# Patient Record
(demographics unavailable — no encounter records)

---

## 2024-10-21 NOTE — PHYSICAL EXAM
[Fully active, able to carry on all pre-disease performance without restriction] : Status 0 - Fully active, able to carry on all pre-disease performance without restriction [General Appearance - Alert] : alert [General Appearance - In No Acute Distress] : in no acute distress [Sclera] : the sclera and conjunctiva were normal [PERRL With Normal Accommodation] : pupils were equal in size, round, and reactive to light [Extraocular Movements] : extraocular movements were intact [Outer Ear] : the ears and nose were normal in appearance [Oropharynx] : the oropharynx was normal [Neck Appearance] : the appearance of the neck was normal [Neck Cervical Mass (___cm)] : no neck mass was observed [Jugular Venous Distention Increased] : there was no jugular-venous distention [Thyroid Diffuse Enlargement] : the thyroid was not enlarged [Thyroid Nodule] : there were no palpable thyroid nodules [Auscultation Breath Sounds / Voice Sounds] : lungs were clear to auscultation bilaterally [Heart Rate And Rhythm] : heart rate was normal and rhythm regular [Heart Sounds] : normal S1 and S2 [Heart Sounds Gallop] : no gallops [Murmurs] : no murmurs [Heart Sounds Pericardial Friction Rub] : no pericardial rub [Examination Of The Chest] : the chest was normal in appearance [Chest Visual Inspection Thoracic Asymmetry] : no chest asymmetry [Diminished Respiratory Excursion] : normal chest expansion [2+] : left 2+ [No Abnormalities] : the abdominal aorta was not enlarged and no bruit was heard [Breast Appearance] : normal in appearance [Breast Palpation Mass] : no palpable masses [Bowel Sounds] : normal bowel sounds [Abdomen Soft] : soft [Abdomen Tenderness] : non-tender [Abdomen Mass (___ Cm)] : no abdominal mass palpated [Cervical Lymph Nodes Enlarged Posterior Bilaterally] : posterior cervical [Cervical Lymph Nodes Enlarged Anterior Bilaterally] : anterior cervical [Supraclavicular Lymph Nodes Enlarged Bilaterally] : supraclavicular [Axillary Lymph Nodes Enlarged Bilaterally] : axillary [Femoral Lymph Nodes Enlarged Bilaterally] : femoral [Inguinal Lymph Nodes Enlarged Bilaterally] : inguinal [No CVA Tenderness] : no ~M costovertebral angle tenderness [No Spinal Tenderness] : no spinal tenderness [Abnormal Walk] : normal gait [Nail Clubbing] : no clubbing  or cyanosis of the fingernails [Musculoskeletal - Swelling] : no joint swelling seen [Motor Tone] : muscle strength and tone were normal [Skin Color & Pigmentation] : normal skin color and pigmentation [Skin Turgor] : normal skin turgor [] : no rash [Deep Tendon Reflexes (DTR)] : deep tendon reflexes were 2+ and symmetric [Sensation] : the sensory exam was normal to light touch and pinprick [No Focal Deficits] : no focal deficits [Oriented To Time, Place, And Person] : oriented to person, place, and time [Impaired Insight] : insight and judgment were intact [Affect] : the affect was normal

## 2024-10-28 NOTE — DISCUSSION/SUMMARY
[EKG obtained to assist in diagnosis and management of assessed problem(s)] : EKG obtained to assist in diagnosis and management of assessed problem(s) [FreeTextEntry1] : 73F HTN, HLD with ongoing CP/SORIANO  HTN: BP better on repeat but still elevated.    HLD: LDL mildly elevated (116). With recent CAC 0, would encourage diet, exercise, and weight loss.   CP/SORIANO: Ongoing. TTE with mild DD. Recent CTA without obstructive CAD.    This patient is able to perform >4 METS of activity without limitation. No evidence of ongoing ischemia, arrhythmia, valvular heart disease or decompensated heart failure.  This patient is optimized from a cardiac standpoint for this low to intermediate risk surgery.  No further cardiac testing is necessary prior to surgery.

## 2024-10-28 NOTE — HISTORY OF PRESENT ILLNESS
[FreeTextEntry1] : 73F HTN, HLD, mild DD presents for preop evaluation   Scheduled for bronchoscopy procedure with Dr Jose Randolph on 10/30 Occasional pressure situated in the center of her chest Still with SORIANO when she walks up the stairs. Suffers from vertigo. Rare palpitations when she is anxious.  BP readings have been elevated, ~160s/80-90s Used to be on anti-hypertensives many yrs ago but stopped as her readings normalized   Former smoker, quit 20yrs ago. (+) recreational for pain. Maternal GM - DM. Retired .   ECG: SR, no ST-T wave changes CTA 2024: CAC 0, no CAD TTE 2024:  1. Left ventricular cavity is normal in size. Left ventricular wall thickness is normal. Left ventricular systolic function is normal with an ejection fraction of 63 % by Hillirad's method of disks. There are no regional wall motion abnormalities seen. 2. There is mild (grade 1) left ventricular diastolic dysfunction, with normal left ventricular filling pressure. 3. Normal right ventricular cavity size, with normal wall thickness, and normal right ventricular systolic function. Tricuspid annular plane systolic excursion (TAPSE) is 2.7 cm (normal >=1.7 cm). 4. Left atrium is normal in size. 5. No significant valvular disease.

## 2024-11-11 NOTE — REASON FOR VISIT
[Follow-Up: _____] : a [unfilled] follow-up visit
[Follow-Up: _____] : a [unfilled] follow-up visit
Medications/Imaging Studies/Labs/EKG

## 2024-11-12 NOTE — ASSESSMENT
[FreeTextEntry1] : Ms. GEORGIA CARMONA, 73 year old female, Former smoker (1ppd x ~20 yrs, quit ~2002), w/ hx of Papillary carcinoma s/p thyroidectomy (2010) now with hypothyroidism, HTN, anxiety and depression. Established care with cardiology for workup regarding dyspnea upon exertion. Underwent a cardiac CT which demonstrated multiple left upper lobe ill- defined nodular opacities. Follow up dedicated CT chest showing a Left upper lobe elongated masslike consolidation, and other left upper lobe nodules which are unchanged from cardiac CT.  Referred by Dr. Laurent for further evaluation and treatment.   Consulted in October: Imaging revealing multiple lung nodules and lung mass, suspicious for malignancy. Discussed biopsy. Scheduled for FB, navigational galaxy bronchoscopy, with biopsy. PET/CT, MR Head w/wo IV contrast, PFTs. Nodify and IQ bloodwork; MR Abd w/wo IV Contrast to further workup adrenal nodule on CT  Patient presents today to discuss results and further plan of care.   I have reviewed the patient's medical records and diagnostic images at time of this office consultation and have made the following recommendation: 1. Path reviewed with patient revealing necrotizing granulomatous inflammation. I would like her to establish care with pulm, will refer to Dr. Medeiros. Discussed she returns to clinic in 3 months with CT Chest without contrast.  2. Additionally, reviewed MRI without any radiological evidence of mets.   Recommendations reviewed with patient during this office visit, and all questions answered; Patient instructed on the importance of follow up and verbalizes understanding.   I, REBEKAH CervantesIM, personally performed the evaluation and management (E/M) services for this established patient. That E/M includes conducting the examination, assessing all new/exacerbated conditions, and establishing a new plan of care. Today, my ACP, Erik Kirby NP, was here to observe my evaluation and management services for this new problem/exacerbated condition to be followed going forward.

## 2024-11-12 NOTE — HISTORY OF PRESENT ILLNESS
[FreeTextEntry1] : Ms. GEORGIA CARMONA, 73 year old female, Former smoker (1ppd x ~20 yrs, quit ~2002), w/ hx of Papillary carcinoma s/p thyroidectomy (2010) now with hypothyroidism, HTN, anxiety and depression. Established care with cardiology for workup regarding dyspnea upon exertion. Underwent a cardiac CT which demonstrated multiple left upper lobe ill- defined nodular opacities. Follow up dedicated CT chest showing a Left upper lobe elongated masslike consolidation, and other left upper lobe nodules which are unchanged from cardiac CT.  Referred by Dr. Laurent for further evaluation and treatment.   Of note: Currently being worked up be Heme for Erythrocytosis.   CT Chest on 10/7/24:  - SUDHA elongated masslike consolidation, difficult to measure given its shape however has an inferior component which is approximately 6 x 1.5 cm (measured on coronal series 601, image 90) and a superior component which is approximately 4 x 2.2 cm (measured on coronal series 601, image 78).  - Unchanged multiple other nodules in the SUDHA, largest 2 cm in the lingula (2-65).  - Indeterminate 1.5 cm left adrenal nodule.  Consulted in October: Imaging revealing multiple lung nodules and lung mass, suspicious for malignancy. Discussed biopsy. Scheduled for FB, navigational galaxy bronchoscopy, with biopsy. PET/CT, MR Head w/wo IV contrast, PFTs. Nodify and IQ bloodwork; MR Abd w/wo IV Contrast to further workup adrenal nodule on CT  Nodify on 10/24/24: Pre-test 98% risk of malignancy  PFTs on 10/25/24: FVC: 2.56 (85%); FEV1;1.82 (80%); DLCO: 20.5 (100%)  PET/CT on 10/27/24:  - FDG-avid irregular, difficult to accurately measure left upper lobe masslike consolidation (SUV 6.2; image 96) and additional FDG-avid and too small to characterize left upper lobe nodules, with the largest in the lingula (SUV 4.5; image 117), are similar in size and appearance as compared to CT dated 10/7/2024. - Nonspecific mild FDG activity in bilateral adrenal glands, demonstrating SUV 5.5 on the left (image 157) and SUV 4.5 on the right. Previously noted indeterminate 1.5 cm left adrenal nodule is better evaluated on recent diagnostic CT. - Large vessel atherosclerotic calcifications.  s/p SUDHA Robotic Assisted FNA on 10/30/24: Negative for malignant cells. Necrotizing granulomatous inflammation.  s/p SUDHA CT Guided core biopsy on 11/1/24: Negative for malignant cells. Necrotizing granulomatous inflammation. The cytology slides show scattered groups of benign bronchial cells, pneumocytes  and few scattered  yeast buds. The core biopsy sections show aggregates of epithelioid histiocytes associated with budding yeast like structures and necrosis.   MR Head w/wo IV Contrast on 11/2/24: - No MRI evidence of acute intracranial pathology. - No evidence of intracranial metastatic disease.  MR Abd w/IV Contrast on 11/2/24: - Mild bilateral adrenal thickening without discrete nodule, likely of doubtful clinical significance. - Hepatomegaly.  PCP/REF: Dr. Zoie Laurent Endocrinologist: Dr. Mick Whitehead CARD: Dr. Marc Kiran  HEME: Dr. Selvin Graff  Patient presents today to discuss results and further plan of care. Overall, she reports to be feeling well. Denies any chest pain, shortness of breath, cough, or hemoptysis.

## 2024-11-12 NOTE — DATA REVIEWED
[FreeTextEntry1] : Independently reviewed the following: - Nodify on 10/24/24 - PFTs on 10/25/24 - PET/CT on 10/27/24 - Path from 10/30/24 - Path from 11/1/24 - MR Head w/wo IV Contrast on 11/2/24 - MR Abd w/IV Contrast on 11/2/24

## 2024-11-22 NOTE — PHYSICAL EXAM
[No Acute Distress] : no acute distress [Normal Oropharynx] : normal oropharynx [Normal Appearance] : normal appearance [No Neck Mass] : no neck mass [Normal Rate/Rhythm] : normal rate/rhythm [Normal S1, S2] : normal s1, s2 [No Murmurs] : no murmurs [No Resp Distress] : no resp distress [No Acc Muscle Use] : no acc muscle use [Normal Palpation] : normal palpation [Normal Rhythm and Effort] : normal rhythm and effort [Clear to Auscultation Bilaterally] : clear to auscultation bilaterally [Normal to Percussion] : normal to percussion [No Abnormalities] : no abnormalities [Benign] : benign [Not Tender] : not tender [Soft] : soft [No HSM] : no hsm [Normal Bowel Sounds] : normal bowel sounds [Normal Gait] : normal gait [No Clubbing] : no clubbing [No Cyanosis] : no cyanosis [No Edema] : no edema [FROM] : FROM [Normal Color/ Pigmentation] : normal color/ pigmentation [No Focal Deficits] : no focal deficits [Oriented x3] : oriented x3 [Normal Affect] : normal affect [TextBox_2] : Overweight

## 2024-11-22 NOTE — HISTORY OF PRESENT ILLNESS
[Former] : former [>= 20 pack years] : >= 20 pack years [TextBox_4] : 11/22/2024 74 yo female, here for initial visit bronchoscopy done 10/30/24 due to large mass on left lung with nodules, biopsy was negative, no CA- lung collapsed after that night, went back to the hospital and did another biopsy  pt of Dr. Randolph  feels horrible, slight SOB, sweating, extremely tired   Past medical history papillary cancer status post thyroidectomy 2010 postsurgical hypothyroidism Hypertension Anxiety depression   SUDHA Robotic Assisted FNA on 10/30/24: Negative for malignant cells. Necrotizing granulomatous inflammation.s/p SUDHA CT Guided core biopsy on 11/1/24: Negative for malignant cells. Necrotizing granulomatous inflammation.The cytology slides show scattered groups of benign bronchial cells, pneumocytes and few scattered yeast buds. The core biopsy sections show aggregates of epithelioid histiocytes associated with budding yeast like structures and necrosis  Hospital Course: Discharge Date 04-Nov-2024  Admission Date 31-Oct-2024 19:03 Reason for Admission SOB  Hospital Course 73 year old female former smoker underwent a Navigational Bronchoscopy w/ biopsy on 10/30/24. She was discharged home but returned to the ER the next day with worsening SOB and mild chest pain. On chest X-ray, she was found to have a left apical PTX. She was admitted on 10/31 and had a L PTC placement with SUDHA bx by IR the next day on 11/1  PATH  Fine Needle Aspiration Report - Auth (Verified)  Specimen(s) Submitted LUNG, LEFT UPPER LOBE, ROBOTIC ASSISTED FNA & FORCEPS BIOPSY  Final Diagnosis LUNG, LEFT UPPER LOBE, ROBOTIC ASSISTED FNA & FORCEPS BIOPSY NEGATIVE FOR MALIGNANT CELLS. Necrotizing granulomatous inflammation. Cytology smears and cell block display a hypocellular composed of groups of uniform and reactive ciliated bronchial epithelial cells, macrophages, and mixed inflammatory cells in a background of amorphous cellular debris. Forceps biopsy : reveals fragments of lung parenchyma with  clusters of epithelioid histiocytes in a background of multinucleated giant cells, lymphocytes and blood. The granulomas are associated with budding yeast like structures These cytomorphologic findings are consistent with non-necrotizing granulomatous inflammation.  Special stains:  GMS stain highlight budding yeast suggestive of  histoplasma. Additional special stains are in progress to attempt a further classification. AFB stain is negative for acid fast bacilli.   [TextBox_11] : 1 [YearQuit] : 1999

## 2024-11-22 NOTE — PROCEDURE
[FreeTextEntry1] : CXR PA  LAT November 22, 2024 Normal cardiac size There is a rounded density and elongated density in the left upper lobe This corresponds to the prior hospitalization chest x-rays Status post pneumothorax No appreciable pneumothorax appears lung reexpanded With review of hospital films it should be noted that the multiple rounded density was not identified on a film of October 30, 2024 and then was present on the follow-up film on October 31, 2024  Pulmonary 6-minute walk exercise study November 22, 2024 Baseline room air O2 saturation 95% Normal study No evidence of exercise-induced hypoxemia  NIOX 24 normal range November 22, 2024  PFT November 22, 2024 Spirometry normal Lung volumes normal Diffusion normal Pulmonary physiology unremarkable     data imaging CHEST PA LAT 2V - ORDERED BY: JOHANN UMANZOR PROCEDURE DATE: 11/09/2024 INTERPRETATION: XR CHEST PA AND LATERAL dated 11/9/2024 1:52 PM CLINICAL INFORMATION: Female, 73 years old. [id number]. PRIOR STUDIES: 11/3/2024 FINDINGS/ IMPRESSION: Compared to prior imaging there has been resolution pulmonary venous congestion with decreasing left-sided pleural effusion which remains and is trace in extent. Again noted is a 2.6 cm well-circumscribed nodule is airspace opacity projecting within the left upper lobe unchanged from prior imaging. Please see CT scan dated 10/31/2024.  CHEST PORTABLE URGENT 1V ORDERED BY: NIALL PARKER PROCEDURE DATE: 11/03/2024 INTERPRETATION: EXAMINATION: XR CHEST URGENT CLINICAL INDICATION: PTC removed TECHNIQUE: Single frontal, portable view of the chest was obtained. COMPARISON: Chest CT 10/31/2024 Chest x-ray None. FINDINGS: Interval removal of left-sided pigtail chest tube. Left-sided pleural effusion, stable. Patchy opacities in the left lung. Normal pulmonary vasculature The heart is normal in size. There is no pneumothorax. IMPRESSION: Alveolar/interstitial infiltrates in the left upper and left lower lobes, small left pleural effusion unchanged. No pneumothorax, left chest tube removed.  XR CHEST PORTABLE ROUTINE 1V ORDERED BY: DENISSE ELIZALDE PROCEDURE DATE: 11/01/2024 INTERPRETATION: CLINICAL INFORMATION: Follow-up TIME OF EXAMINATION: November 1 at 6:33 AM EXAM: Portable chest FINDINGS: Left apical pneumothorax with the apical pleural line poorly seen but relative absence of markings. Left midlung opacity unchanged. The right lung is clear. Small left effusion. COMPARISON: October 31 IMPRESSION: Follow-up showing similar small left pneumothorax.  CT CHEST ORDERED BY: SARAH HOPPER PROCEDURE DATE: 10/31/2024 INTERPRETATION: CLINICAL INFORMATION: 73-year-old female status post bronchoscopy 10/30/2024 with chest pain and question of pneumothorax on chest x-ray COMPARISON: Chest x-ray 10/31/2024. Chest CT 10/07/2024 CONTRAST/COMPLICATIONS: None PROCEDURE: CT of the Chest was performed. Sagittal and coronal reformats were performed. FINDINGS: LUNGS AND LARGE AIRWAYS: Patent central airways. Left upper lobe foci of consolidation with additional focal nodules better demonstrated on prior examination. PLEURA: Small left pleural effusion. Trace right pleural effusion. Apical left pneumothorax approximately 20%. VESSELS: Within normal limits. HEART: Heart size is normal. No pericardial effusion. MEDIASTINUM AND TYLER: No lymphadenopathy. CHEST WALL AND LOWER NECK: Subcutaneous emphysema left lateral chest wall VISUALIZED UPPER ABDOMEN: Within normal limits. BONES: Degenerative change. IMPRESSION: Left apical pneumothorax. Left lateral chest wall subcutaneous emphysema. Foci of consolidation and additional focal nodules left upper lobe as previously demonstrated   - PETCT SKUL-THI ONC FDG INIT - ORDERED BY: CHRISTY RIVAS PROCEDURE DATE: 10/27/2024 INTERPRETATION: CLINICAL INFORMATION: Left upper lobe masslike consolidation concerning for neoplasm. TREATMENT STRATEGY EVALUATION: Initial AREA IMAGED: Skull base-to-thigh FASTING BLOOD SUGAR: 91 mg/dl RADIOPHARMACEUTICAL: 12.857 mCi F-18 FDG, I.V. I.V. SITE: antecubital left UPTAKE PERIOD: 55 min SCANNER: [employer] Gen2 ORAL CONTRAST: Omnipaque 300 PHARMACOLOGIC INTERVENTION: None. TECHNIQUE: Following intravenous injection of above radiopharmaceutical and uptake period, PET/CT was performed. CT protocol was optimized for PET attenuation correction and anatomic localization and was not designed to produce and cannot replace state-of-the-art diagnostic CT images with specific imaging protocols for different body parts and indications. Images were reconstructed and reviewed in axial, coronal and sagittal views and three-dimensional MIP. COMPARISON: None. OTHER STUDIES USED FOR CORRELATION: CT chest dated 10/7/2024 and CTA of the coronary arteries dated 10 8/26/2024 FINDINGS: HEAD/NECK: Physiologic FDG activity in visualized brain, head, and neck. THORAX: No abnormal FDG activity. No lymphadenopathy. LUNGS: FDG-avid irregular, difficult to accurately measure left upper lobe masslike consolidation (SUV 6.2; image 96) and additional FDG-avid and too small to characterize left upper lobe nodules, with the largest in the lingula (SUV 4.5; image 117), are similar in size and appearance as compared to CT dated 10/7/2024. PLEURA/PERICARDIUM: No abnormal FDG activity. No effusion. HEBATOBILIARY/PANCREAS: Physiologic FDG activity. For reference, normal liver demonstrates SUV mean 2.7. Cholecystectomy. SPLEEN: Physiologic FDG activity. Normal in size. ADRENAL GLANDS: Nonspecific mild FDG activity in bilateral adrenal glands, demonstrating SUV 5.5 on the left (image 157) and SUV 4.5 on the right. Previously noted indeterminate 1.5 cm left adrenal nodule is better evaluated on recent diagnostic CT. KIDNEYS/URINARY BLADDER: Physiologic excreted FDG activity. REPRODUCTIVE ORGANS: No abnormal FDG activity. ABDOMINOPELVIC LYMPH NODES/RETROPERITONEUM: No enlarged or FDG-avid lymph node. ESOPHAGUS/STOMACH/BOWEL/PERITONEUM/MESENTERY: No abnormal FDG activity. VESSELS: Large vessel atherosclerotic calcifications. BONES/SOFT TISSUES: No abnormal FDG activity. IMPRESSION: Abnormal skull-to-thigh FDG-PET/CT scan. 1. FDG-avid left upper lobe masslike consolidation and additional FDG-avid and too small to characterize left upper lobe lung nodules, the largest in the lingula, are similar in size and appearance as compared to CT dated 10/7/2024. Findings are concerning for multifocal lung neoplasm. 2. Nonspecific mild FDG activity in bilateral adrenal glands, slightly greater on the left, where an indeterminate 1.5 cm nodule was noted on CT dated 10/7/2024. MRI may be obtained for further characterization. 3. The remainder of the study demonstrates no evidence of FDG-avid disease. 2 - CT CHEST - ORDERED BY: CARLOS AYALA PROCEDURE DATE: 10/07/2024 INTERPRETATION: INDICATION: Abnormal lung findings on cardiac CT  TECHNIQUE: Helical acquisition images of the chest without intravenous contrast. Maximum intensity projection images were generated.  COMPARISON: Partially included lungs of a cardiac CTA 8/26/2024.  FINDINGS:  LUNGS/AIRWAYS/PLEURA: Left upper lobe elongated masslike consolidation, difficult to measure given its shape however has an inferior component which is approximately 6 x 1.5 cm (measured on coronal series 601, image 90) and a superior component which is approximately 4 x 2.2 cm (measured on coronal series 601, image 78). Unchanged multiple other nodules in the left upper lobe, largest 2 cm in the lingula (2-65). Patent airways through the segmental bronchi. No pleural effusion.  LYMPH NODES/MEDIASTINUM: No lymphadenopathy.  HEART/VASCULATURE: Normal size heart and aorta. No pericardial effusion.  UPPER ABDOMEN: Indeterminate 1.5 cm left adrenal nodule.  BONES/SOFT TISSUES: Unremarkable.   IMPRESSION:  Left upper lobe elongated masslike consolidation, not fully within field-of-view of prior, and other left upper lobe nodules which are unchanged. Leading differential is lung neoplasm.  Indeterminate 1.5 cm left adrenal nodule  CT angio coronary artery study August 26, 2024 no thank Non Cardiac: Multiple left upper lobe ill-defined nodular opacities within the partially imaged lungs. Differential diagnostic considerations include infectious or neoplastic etiologies. Dedicated noncontrast chest CT is recommended for complete evaluation.  Blood draw

## 2024-11-22 NOTE — DISCUSSION/SUMMARY
[FreeTextEntry1] : Post cardiothoracic surgery biopsy Pathology Positive necrotizing granulomatous inflammation Noted AFB smear is negative GMS stain highlighted budding yeast suggestive of histoplasma additional stains are pending Negative for malignant cells  Postop complications including pneumothorax treated with a pigtail catheter with demonstrated resolution of the pneumothorax  In view of possible histoplasmosis antigen testing was ordered Will follow-up with pathology for any additional stains or cultures available Who is no evidence for nontuberculous Mycobacterium Inconsistent with rheumatoid nodule per pathology No suggestion of vasculitis Other etiologies can include sarcoidosis Granulomatous disease Recommendations Complete serology is pending including histoplasmosis ANCA ACE level Based on prior studies most patients who did not receive medical therapy with no evidence of progression clinically or development of new nodules There is some consideration for steroid treatment I would hold off on additional treatment pending blood work and additional special stains is available from microbiology pathology Office follow-up 1 month Repeat CT imaging in 3 months In addition noted Rounded density left upper lobe appeared new post postop may be due to some lung injury we will follow clinically with a chest x-ray in 1 month All questions answered

## 2024-12-31 NOTE — PROCEDURE
[FreeTextEntry1] : CXR PA  LAT November 22, 2024 Normal cardiac size There is a rounded density and elongated density in the left upper lobe This corresponds to the prior hospitalization chest x-rays Status post pneumothorax No appreciable pneumothorax appears lung reexpanded With review of hospital films it should be noted that the multiple rounded density was not identified on a film of October 30, 2024 and then was present on the follow-up film on October 31, 2024  Pulmonary 6-minute walk exercise study November 22, 2024 Baseline room air O2 saturation 95% Normal study No evidence of exercise-induced hypoxemia  NIOX 24 normal range November 22, 2024  PFT November 22, 2024 Spirometry normal Lung volumes normal Diffusion normal Pulmonary physiology unremarkable   data imaging CHEST PA LAT 2V - ORDERED BY: JOHANN UMANZOR PROCEDURE DATE: 11/09/2024 INTERPRETATION: XR CHEST PA AND LATERAL dated 11/9/2024 1:52 PM CLINICAL INFORMATION: Female, 73 years old. [id number]. PRIOR STUDIES: 11/3/2024 FINDINGS/ IMPRESSION: Compared to prior imaging there has been resolution pulmonary venous congestion with decreasing left-sided pleural effusion which remains and is trace in extent. Again noted is a 2.6 cm well-circumscribed nodule is airspace opacity projecting within the left upper lobe unchanged from prior imaging. Please see CT scan dated 10/31/2024.  CHEST PORTABLE URGENT 1V ORDERED BY: NIALL PARKER PROCEDURE DATE: 11/03/2024 INTERPRETATION: EXAMINATION: XR CHEST URGENT CLINICAL INDICATION: PTC removed TECHNIQUE: Single frontal, portable view of the chest was obtained. COMPARISON: Chest CT 10/31/2024 Chest x-ray None. FINDINGS: Interval removal of left-sided pigtail chest tube. Left-sided pleural effusion, stable. Patchy opacities in the left lung. Normal pulmonary vasculature The heart is normal in size. There is no pneumothorax. IMPRESSION: Alveolar/interstitial infiltrates in the left upper and left lower lobes, small left pleural effusion unchanged. No pneumothorax, left chest tube removed.  XR CHEST PORTABLE ROUTINE 1V ORDERED BY: DENISSE ELIZALDE PROCEDURE DATE: 11/01/2024 INTERPRETATION: CLINICAL INFORMATION: Follow-up TIME OF EXAMINATION: November 1 at 6:33 AM EXAM: Portable chest FINDINGS: Left apical pneumothorax with the apical pleural line poorly seen but relative absence of markings. Left midlung opacity unchanged. The right lung is clear. Small left effusion. COMPARISON: October 31 IMPRESSION: Follow-up showing similar small left pneumothorax.  CT CHEST ORDERED BY: SARAH HOPPER PROCEDURE DATE: 10/31/2024 INTERPRETATION: CLINICAL INFORMATION: 73-year-old female status post bronchoscopy 10/30/2024 with chest pain and question of pneumothorax on chest x-ray COMPARISON: Chest x-ray 10/31/2024. Chest CT 10/07/2024 CONTRAST/COMPLICATIONS: None PROCEDURE: CT of the Chest was performed. Sagittal and coronal reformats were performed. FINDINGS: LUNGS AND LARGE AIRWAYS: Patent central airways. Left upper lobe foci of consolidation with additional focal nodules better demonstrated on prior examination. PLEURA: Small left pleural effusion. Trace right pleural effusion. Apical left pneumothorax approximately 20%. VESSELS: Within normal limits. HEART: Heart size is normal. No pericardial effusion. MEDIASTINUM AND TYLER: No lymphadenopathy. CHEST WALL AND LOWER NECK: Subcutaneous emphysema left lateral chest wall VISUALIZED UPPER ABDOMEN: Within normal limits. BONES: Degenerative change. IMPRESSION: Left apical pneumothorax. Left lateral chest wall subcutaneous emphysema. Foci of consolidation and additional focal nodules left upper lobe as previously demonstrated   - PETCT SKUL-THI ONC FDG INIT - ORDERED BY: CHRISTY RIVAS PROCEDURE DATE: 10/27/2024 INTERPRETATION: CLINICAL INFORMATION: Left upper lobe masslike consolidation concerning for neoplasm. TREATMENT STRATEGY EVALUATION: Initial AREA IMAGED: Skull base-to-thigh FASTING BLOOD SUGAR: 91 mg/dl RADIOPHARMACEUTICAL: 12.857 mCi F-18 FDG, I.V. I.V. SITE: antecubital left UPTAKE PERIOD: 55 min SCANNER: Progression Gen2 ORAL CONTRAST: Omnipaque 300 PHARMACOLOGIC INTERVENTION: None. TECHNIQUE: Following intravenous injection of above radiopharmaceutical and uptake period, PET/CT was performed. CT protocol was optimized for PET attenuation correction and anatomic localization and was not designed to produce and cannot replace state-of-the-art diagnostic CT images with specific imaging protocols for different body parts and indications. Images were reconstructed and reviewed in axial, coronal and sagittal views and three-dimensional MIP. COMPARISON: None. OTHER STUDIES USED FOR CORRELATION: CT chest dated 10/7/2024 and CTA of the coronary arteries dated 10 8/26/2024 FINDINGS: HEAD/NECK: Physiologic FDG activity in visualized brain, head, and neck. THORAX: No abnormal FDG activity. No lymphadenopathy. LUNGS: FDG-avid irregular, difficult to accurately measure left upper lobe masslike consolidation (SUV 6.2; image 96) and additional FDG-avid and too small to characterize left upper lobe nodules, with the largest in the lingula (SUV 4.5; image 117), are similar in size and appearance as compared to CT dated 10/7/2024. PLEURA/PERICARDIUM: No abnormal FDG activity. No effusion. HEBATOBILIARY/PANCREAS: Physiologic FDG activity. For reference, normal liver demonstrates SUV mean 2.7. Cholecystectomy. SPLEEN: Physiologic FDG activity. Normal in size. ADRENAL GLANDS: Nonspecific mild FDG activity in bilateral adrenal glands, demonstrating SUV 5.5 on the left (image 157) and SUV 4.5 on the right. Previously noted indeterminate 1.5 cm left adrenal nodule is better evaluated on recent diagnostic CT. KIDNEYS/URINARY BLADDER: Physiologic excreted FDG activity. REPRODUCTIVE ORGANS: No abnormal FDG activity. ABDOMINOPELVIC LYMPH NODES/RETROPERITONEUM: No enlarged or FDG-avid lymph node. ESOPHAGUS/STOMACH/BOWEL/PERITONEUM/MESENTERY: No abnormal FDG activity. VESSELS: Large vessel atherosclerotic calcifications. BONES/SOFT TISSUES: No abnormal FDG activity. IMPRESSION: Abnormal skull-to-thigh FDG-PET/CT scan. 1. FDG-avid left upper lobe masslike consolidation and additional FDG-avid and too small to characterize left upper lobe lung nodules, the largest in the lingula, are similar in size and appearance as compared to CT dated 10/7/2024. Findings are concerning for multifocal lung neoplasm. 2. Nonspecific mild FDG activity in bilateral adrenal glands, slightly greater on the left, where an indeterminate 1.5 cm nodule was noted on CT dated 10/7/2024. MRI may be obtained for further characterization. 3. The remainder of the study demonstrates no evidence of FDG-avid disease. 2 - CT CHEST - ORDERED BY: CARLOS AYALA PROCEDURE DATE: 10/07/2024 INTERPRETATION: INDICATION: Abnormal lung findings on cardiac CT  TECHNIQUE: Helical acquisition images of the chest without intravenous contrast. Maximum intensity projection images were generated.  COMPARISON: Partially included lungs of a cardiac CTA 8/26/2024.  FINDINGS:  LUNGS/AIRWAYS/PLEURA: Left upper lobe elongated masslike consolidation, difficult to measure given its shape however has an inferior component which is approximately 6 x 1.5 cm (measured on coronal series 601, image 90) and a superior component which is approximately 4 x 2.2 cm (measured on coronal series 601, image 78). Unchanged multiple other nodules in the left upper lobe, largest 2 cm in the lingula (2-65). Patent airways through the segmental bronchi. No pleural effusion.  LYMPH NODES/MEDIASTINUM: No lymphadenopathy.  HEART/VASCULATURE: Normal size heart and aorta. No pericardial effusion.  UPPER ABDOMEN: Indeterminate 1.5 cm left adrenal nodule.  BONES/SOFT TISSUES: Unremarkable.   IMPRESSION:  Left upper lobe elongated masslike consolidation, not fully within field-of-view of prior, and other left upper lobe nodules which are unchanged. Leading differential is lung neoplasm.  Indeterminate 1.5 cm left adrenal nodule  CT angio coronary artery study August 26, 2024 no thank Non Cardiac: Multiple left upper lobe ill-defined nodular opacities within the partially imaged lungs. Differential diagnostic considerations include infectious or neoplastic etiologies. Dedicated noncontrast chest CT is recommended for complete evaluation.  Blood draw

## 2024-12-31 NOTE — REASON FOR VISIT
[Follow-Up] : a follow-up visit [Shortness of Breath] : shortness of breath [TextBox_44] : Nectrozing granuloma

## 2024-12-31 NOTE — HISTORY OF PRESENT ILLNESS
[Former] : former [>= 20 pack years] : >= 20 pack years [TextBox_4] : 11/22/2024 72 yo female, here for initial visit bronchoscopy done 10/30/24 due to large mass on left lung with nodules, biopsy was negative, no CA- lung collapsed after that night, went back to the hospital and did another biopsy  pt of Dr. Randolph  feels horrible, slight SOB, sweating, extremely tired   Past medical history papillary cancer status post thyroidectomy 2010 postsurgical hypothyroidism Hypertension Anxiety depression   SUDHA Robotic Assisted FNA on 10/30/24: Negative for malignant cells. Necrotizing granulomatous inflammation.s/p SUDHA CT Guided core biopsy on 11/1/24: Negative for malignant cells. Necrotizing granulomatous inflammation.The cytology slides show scattered groups of benign bronchial cells, pneumocytes and few scattered yeast buds. The core biopsy sections show aggregates of epithelioid histiocytes associated with budding yeast like structures and necrosis  Hospital Course: Discharge Date 04-Nov-2024  Admission Date 31-Oct-2024 19:03 Reason for Admission SOB  Hospital Course 73 year old female former smoker underwent a Navigational Bronchoscopy w/ biopsy on 10/30/24. She was discharged home but returned to the ER the next day with worsening SOB and mild chest pain. On chest X-ray, she was found to have a left apical PTX. She was admitted on 10/31 and had a L PTC placement with SUDHA bx by IR the next day on 11/1  PATH  Fine Needle Aspiration Report - Auth (Verified)  Specimen(s) Submitted LUNG, LEFT UPPER LOBE, ROBOTIC ASSISTED FNA & FORCEPS BIOPSY  Final Diagnosis LUNG, LEFT UPPER LOBE, ROBOTIC ASSISTED FNA & FORCEPS BIOPSY NEGATIVE FOR MALIGNANT CELLS. Necrotizing granulomatous inflammation. Cytology smears and cell block display a hypocellular composed of groups of uniform and reactive ciliated bronchial epithelial cells, macrophages, and mixed inflammatory cells in a background of amorphous cellular debris. Forceps biopsy : reveals fragments of lung parenchyma with  clusters of epithelioid histiocytes in a background of multinucleated giant cells, lymphocytes and blood. The granulomas are associated with budding yeast like structures These cytomorphologic findings are consistent with non-necrotizing granulomatous inflammation.  Special stains:  GMS stain highlight budding yeast suggestive of  histoplasma. Additional special stains are in progress to attempt a further classification. AFB stain is negative for acid fast bacilli.   [TextBox_11] : 1 [YearQuit] : 1999

## 2024-12-31 NOTE — DISCUSSION/SUMMARY
[FreeTextEntry1] : Post cardiothoracic surgery biopsy Pathology Positive necrotizing granulomatous inflammation Noted AFB smear is negative GMS stain highlighted budding yeast suggestive of histoplasma additional stains are pending Negative for malignant cells HISTO IGG IGM titers  negative  Postop complications including pneumothorax treated with a pigtail catheter with demonstrated resolution of the pneumothorax  In view of possible histoplasmosis antigen testing was ordered and Negative Will follow-up with pathology for any additional stains or cultures available Who is no evidence for nontuberculous Mycobacterium Inconsistent with rheumatoid nodule per pathology No suggestion of vasculitis Other etiologies can include sarcoidosis ACE level is normal Granulomatous disease Recommendations Complete serology is pending including histoplasmosis ANCA ACE level Based on prior studies most patients who did not receive medical therapy with no evidence of progression clinically or development of new nodules There is some consideration for steroid treatment I would hold off on additional treatment pending blood work and additional special stains is available from microbiology pathology Office follow-up 3 month Repeat CT imaging in 3 months In addition noted Rounded density left upper lobe appeared new post postop may be due to some lung injury we will follow clinically with a chest x-ray in 1 month All questions answered f/u return from from [place]

## 2025-01-03 NOTE — PHYSICAL EXAM
[Alert] : alert [Normal Sclera/Conjunctiva] : normal sclera/conjunctiva [Normal Outer Ear/Nose] : the ears and nose were normal in appearance [No Neck Mass] : no neck mass was observed [No Respiratory Distress] : no respiratory distress [Normal PMI] : the apical impulse was normal [Normal Bowel Sounds] : normal bowel sounds [No CVA Tenderness] : no ~M costovertebral angle tenderness [No Stigmata of Cushings Syndrome] : no stigmata of Cushings Syndrome [Oriented x3] : oriented to person, place, and time

## 2025-01-03 NOTE — HISTORY OF PRESENT ILLNESS
[FreeTextEntry1] : This is a pleasant 73 yr old F seen today for a new patient visit for postsurgical hypothyroidism and adrenal nodule  Other hx of HTN, anxiety and depression.  She reports having a partial thyroidectomy in 1982 and then a completion thyroidectomy in in 2010. At that time she was diagnosed with papillary thyroid cancer.  No MOON treatment was administered. She is currently on Levothyroxine 125 mcg, 1 tab daily  She was recently also diagnosed and treated for necrotizing granulomatous inflammation.   - Left adrenal nodule was an incidental finding on a recent CT Chest from October 2024.

## 2025-02-18 NOTE — REASON FOR VISIT
[Follow-Up: _____] : a [unfilled] follow-up visit [Home] : at home, [unfilled] , at the time of the visit. [Medical Office: ([facility])___] : at the medical office located in  [Telephone (audio)] : This telephonic visit was provided via audio only technology. [Technical] : patient unable to effectively utilize tele-video due to technical issues. [Verbal consent obtained from patient] : the patient, [unfilled]

## 2025-02-18 NOTE — HISTORY OF PRESENT ILLNESS
[FreeTextEntry1] : Ms. GEORGIA CARMONA, 73 year old female, Former smoker (1ppd x ~20 yrs, quit ~2002), w/ hx of Papillary carcinoma s/p thyroidectomy (2010) now with hypothyroidism, HTN, anxiety and depression. Established care with cardiology for workup regarding dyspnea upon exertion. Underwent a cardiac CT which demonstrated multiple left upper lobe ill- defined nodular opacities. Follow up dedicated CT chest showing a Left upper lobe elongated masslike consolidation, and other left upper lobe nodules which are unchanged from cardiac CT.  Referred by Dr. Laurent for further evaluation and treatment.   Of note: Currently being worked up be Heme for Erythrocytosis.   CT Chest on 10/7/24:  - SUDHA elongated masslike consolidation, difficult to measure given its shape however has an inferior component which is approximately 6 x 1.5 cm (measured on coronal series 601, image 90) and a superior component which is approximately 4 x 2.2 cm (measured on coronal series 601, image 78).  - Unchanged multiple other nodules in the SUDHA, largest 2 cm in the lingula (2-65).  - Indeterminate 1.5 cm left adrenal nodule.  Consulted in October: Imaging revealing multiple lung nodules and lung mass, suspicious for malignancy. Discussed biopsy. Scheduled for FB, navigational galaxy bronchoscopy, with biopsy. PET/CT, MR Head w/wo IV contrast, PFTs. Nodify and IQ bloodwork; MR Abd w/wo IV Contrast to further workup adrenal nodule on CT  Nodify on 10/24/24: Pre-test 98% risk of malignancy  PFTs on 10/25/24: FVC: 2.56 (85%); FEV1;1.82 (80%); DLCO: 20.5 (100%)  PET/CT on 10/27/24:  - FDG-avid irregular, difficult to accurately measure left upper lobe masslike consolidation (SUV 6.2; image 96) and additional FDG-avid and too small to characterize left upper lobe nodules, with the largest in the lingula (SUV 4.5; image 117), are similar in size and appearance as compared to CT dated 10/7/2024. - Nonspecific mild FDG activity in bilateral adrenal glands, demonstrating SUV 5.5 on the left (image 157) and SUV 4.5 on the right. Previously noted indeterminate 1.5 cm left adrenal nodule is better evaluated on recent diagnostic CT. - Large vessel atherosclerotic calcifications.  s/p SUDHA Robotic Assisted FNA on 10/30/24: Negative for malignant cells. Necrotizing granulomatous inflammation.  s/p SUDHA CT Guided core biopsy on 11/1/24: Negative for malignant cells. Necrotizing granulomatous inflammation. The cytology slides show scattered groups of benign bronchial cells, pneumocytes  and few scattered  yeast buds. The core biopsy sections show aggregates of epithelioid histiocytes associated with budding yeast like structures and necrosis.   MR Head w/wo IV Contrast on 11/2/24: - No MRI evidence of acute intracranial pathology. - No evidence of intracranial metastatic disease.  MR Abd w/IV Contrast on 11/2/24: - Mild bilateral adrenal thickening without discrete nodule, likely of doubtful clinical significance. - Hepatomegaly.  Seen in November: Path reviewed with patient revealing necrotizing granulomatous inflammation. I would like her to establish care with pulm, will refer to Dr. Medeiros. Discussed she returns to clinic in 3 months with CT Chest without contrast.   Established care with Dr. Wood  CT Chest on 1/16/25:  - SUDHA [id number] consolidative opacity/mass, grossly stable compared to PET/CT on 10/27/2024. - Interval resolution of left pneumothorax compared to 10/31/2024. - Aortic calcifications. - s/p Cholecystectomy. - A 1.2 cm left adrenal nodule.  - Bilateral renal subcentimeter hypodense foci, too small to characterize. No hydronephrosis. Symmetric renal enhancement.  PCP/REF: Dr. Zoie Laurent Endocrinologist: Dr. Mick Whitehead CARD: Dr. Marc Kiran  HEME: Dr. Selvin Graff  Patient presents today for follow up via tele visit. Overall, she reports to be feeling well. Denies any chest pain, shortness of breath, cough, or hemoptysis.

## 2025-02-18 NOTE — ASSESSMENT
[FreeTextEntry1] : Ms. GEORGIA CARMONA, 73 year old female, Former smoker (1ppd x ~20 yrs, quit ~2002), w/ hx of Papillary carcinoma s/p thyroidectomy (2010) now with hypothyroidism, HTN, anxiety and depression. Established care with cardiology for workup regarding dyspnea upon exertion. Underwent a cardiac CT which demonstrated multiple left upper lobe ill- defined nodular opacities. Follow up dedicated CT chest showing a Left upper lobe elongated masslike consolidation, and other left upper lobe nodules which are unchanged from cardiac CT.  Referred by Dr. Laurent for further evaluation and treatment.   Consulted in October: Imaging revealing multiple lung nodules and lung mass, suspicious for malignancy. Discussed biopsy. Scheduled for FB, navigational galaxy bronchoscopy, with biopsy. PET/CT, MR Head w/wo IV contrast, PFTs. Nodify and IQ bloodwork; MR Abd w/wo IV Contrast to further workup adrenal nodule on CT  Patient presents today to discuss results and further plan of care via tele visit.   I have reviewed the patient's medical records and diagnostic images at time of this office consultation and have made the following recommendation: 1. CT Chest reviewed with patient, stable lung findings. I discussed no further thoracic surgery intervention indicated at this time. Continue care with pulm and return to clinic as needed. She is agreeable. 2. Follow up with endo regarding adrenal nodule.   Recommendations reviewed with patient during this office visit, and all questions answered; Patient instructed on the importance of follow up and verbalizes understanding.   I, CHRISTY Cervantes, personally performed the evaluation and management (E/M) services for this established patient. That E/M includes conducting the examination, assessing all new/exacerbated conditions, and establishing a new plan of care. Today, my ACP, Erik Kirby NP, was here to observe my evaluation and management services for this new problem/exacerbated condition to be followed going forward.

## 2025-07-01 NOTE — PROCEDURE
[FreeTextEntry1] : s/p SUDHA Robotic Assisted FNA on 10/30/24: Negative for malignant cells. Necrotizing granulomatous inflammation.  s/p SUDHA CT Guided core biopsy on 11/1/24: Negative for malignant cells. Necrotizing granulomatous inflammation. The cytology slides show scattered groups of benign bronchial cells, pneumocytes and few scattered yeast buds. The core biopsy sections show aggregates of epithelioid histiocytes associated with budding yeast like structures and necrosis  Pulmonary physiology PFT July 1, 2025 Mild obstructive ventilatory impairment Lung biopsy normal with no significant air trapping Diffusion normal range 9024% of predicted Hemoglobin 14.4 Data comparison to November 22, 2024 demonstrates overall interval improvement of pulmonary physiology NIOX 16 normal range no evidence of active airway inflammation July 1, 2025  Pulmonary 6-minute walk exercise study Baseline room air O2 saturation 97% Room air study is normal No desaturation on room air No evidence of exercise-induced hypoxemia  EXAM: [id number] - CT ABDOMEN AND PELVIS WAW IC - ORDERED BY: JER WHITT EXAM: [id number] - CT CHEST IC - ORDERED BY: CHRISTY RIVAS PROCEDURE DATE: 01/16/2025 INTERPRETATION: CLINICAL INFORMATION: Adrenal nodule. COMPARISON: MR abdomen 11/2/2024. CONTRAST/COMPLICATIONS: IV Contrast: Omnipaque 350 90 cc administered 10 cc discarded Oral Contrast: NONE PROCEDURE: CT of the Chest, Abdomen and Pelvis was performed. Sagittal and coronal reformats were performed. FINDINGS: CHEST: LUNGS AND LARGE AIRWAYS: Patent central airways. Left upper lobe [id number] consolidative opacity/mass, grossly stable compared to PET/CT on 10/27/2024. PLEURA: No pleural effusion. Interval resolution of left pneumothorax compared to 10/31/2024. VESSELS: Aortic calcifications. HEART: Heart size is normal. No pericardial effusion. MEDIASTINUM AND TYLER: No lymphadenopathy. CHEST WALL AND LOWER NECK: Within normal limits. ABDOMEN AND PELVIS: LIVER: Within normal limits. BILE DUCTS: Normal caliber. GALLBLADDER: Cholecystectomy. SPLEEN: Within normal limits. PANCREAS: Within normal limits. ADRENALS: A 1.2 cm left adrenal nodule. Precontrast Hounsfield unit, -1. 5 minute postcontrast Hounsfield unit, 63. 15 minute postcontrast Hounsfield unit, 16. Although the evaluation is somewhat limited secondary to not strict adherence of the adrenal washout protocol, the calculated absolute washout of the adrenal gland nodule using the available phases is 73.4%. Given the density of the adrenal nodule less than 10 Hounsfield unit in the precontrast phase, findings are highly suggestive of adrenal adenoma. Normal right adrenal gland. KIDNEYS/URETERS: Bilateral renal subcentimeter hypodense foci, too small to characterize. No hydronephrosis. Symmetric renal enhancement. BLADDER: Within normal limits. REPRODUCTIVE ORGANS: Uterus and adnexa within normal limits. BOWEL: No bowel obstruction. PERITONEUM/RETROPERITONEUM: Within normal limits. VESSELS: Atherosclerotic changes. LYMPH NODES: No lymphadenopathy. ABDOMINAL WALL: Within normal limits. BONES: Degenerative changes. IMPRESSION: Stable left upper lobe consolidative opacity/mass. Left adrenal nodule, most compatible with adrenal adenoma..   CXR PA  LAT November 22, 2024 Normal cardiac size There is a rounded density and elongated density in the left upper lobe This corresponds to the prior hospitalization chest x-rays Status post pneumothorax No appreciable pneumothorax appears lung reexpanded With review of hospital films it should be noted that the multiple rounded density was not identified on a film of October 30, 2024 and then was present on the follow-up film on October 31, 2024  Pulmonary 6-minute walk exercise study November 22, 2024 Baseline room air O2 saturation 95% Normal study No evidence of exercise-induced hypoxemia  NIOX 24 normal range November 22, 2024  PFT November 22, 2024 Spirometry normal Lung volumes normal Diffusion normal Pulmonary physiology unremarkable   data imaging CHEST PA LAT 2V - ORDERED BY: JOHANN UMANZOR PROCEDURE DATE: 11/09/2024 INTERPRETATION: XR CHEST PA AND LATERAL dated 11/9/2024 1:52 PM CLINICAL INFORMATION: Female, 73 years old. [id number]. PRIOR STUDIES: 11/3/2024 FINDINGS/ IMPRESSION: Compared to prior imaging there has been resolution pulmonary venous congestion with decreasing left-sided pleural effusion which remains and is trace in extent. Again noted is a 2.6 cm well-circumscribed nodule is airspace opacity projecting within the left upper lobe unchanged from prior imaging. Please see CT scan dated 10/31/2024.  CHEST PORTABLE URGENT 1V ORDERED BY: NIALL PARKER PROCEDURE DATE: 11/03/2024 INTERPRETATION: EXAMINATION: XR CHEST URGENT CLINICAL INDICATION: PTC removed TECHNIQUE: Single frontal, portable view of the chest was obtained. COMPARISON: Chest CT 10/31/2024 Chest x-ray None. FINDINGS: Interval removal of left-sided pigtail chest tube. Left-sided pleural effusion, stable. Patchy opacities in the left lung. Normal pulmonary vasculature The heart is normal in size. There is no pneumothorax. IMPRESSION: Alveolar/interstitial infiltrates in the left upper and left lower lobes, small left pleural effusion unchanged. No pneumothorax, left chest tube removed.  XR CHEST PORTABLE ROUTINE 1V ORDERED BY: DENISSE ELIZALDE PROCEDURE DATE: 11/01/2024 INTERPRETATION: CLINICAL INFORMATION: Follow-up TIME OF EXAMINATION: November 1 at 6:33 AM EXAM: Portable chest FINDINGS: Left apical pneumothorax with the apical pleural line poorly seen but relative absence of markings. Left midlung opacity unchanged. The right lung is clear. Small left effusion. COMPARISON: October 31 IMPRESSION: Follow-up showing similar small left pneumothorax.  CT CHEST ORDERED BY: SARAH HOPPER PROCEDURE DATE: 10/31/2024 INTERPRETATION: CLINICAL INFORMATION: 73-year-old female status post bronchoscopy 10/30/2024 with chest pain and question of pneumothorax on chest x-ray COMPARISON: Chest x-ray 10/31/2024. Chest CT 10/07/2024 CONTRAST/COMPLICATIONS: None PROCEDURE: CT of the Chest was performed. Sagittal and coronal reformats were performed. FINDINGS: LUNGS AND LARGE AIRWAYS: Patent central airways. Left upper lobe foci of consolidation with additional focal nodules better demonstrated on prior examination. PLEURA: Small left pleural effusion. Trace right pleural effusion. Apical left pneumothorax approximately 20%. VESSELS: Within normal limits. HEART: Heart size is normal. No pericardial effusion. MEDIASTINUM AND TYLER: No lymphadenopathy. CHEST WALL AND LOWER NECK: Subcutaneous emphysema left lateral chest wall VISUALIZED UPPER ABDOMEN: Within normal limits. BONES: Degenerative change. IMPRESSION: Left apical pneumothorax. Left lateral chest wall subcutaneous emphysema. Foci of consolidation and additional focal nodules left upper lobe as previously demonstrated   - PETCT SKUL-THI ONC FDG INIT - ORDERED BY: CHRISTY RIVAS PROCEDURE DATE: 10/27/2024 INTERPRETATION: CLINICAL INFORMATION: Left upper lobe masslike consolidation concerning for neoplasm. TREATMENT STRATEGY EVALUATION: Initial AREA IMAGED: Skull base-to-thigh FASTING BLOOD SUGAR: 91 mg/dl RADIOPHARMACEUTICAL: 12.857 mCi F-18 FDG, I.V. I.V. SITE: antecubital left UPTAKE PERIOD: 55 min SCANNER: [employer] ORAL CONTRAST: Omnipaque 300 PHARMACOLOGIC INTERVENTION: None. TECHNIQUE: Following intravenous injection of above radiopharmaceutical and uptake period, PET/CT was performed. CT protocol was optimized for PET attenuation correction and anatomic localization and was not designed to produce and cannot replace state-of-the-art diagnostic CT images with specific imaging protocols for different body parts and indications. Images were reconstructed and reviewed in axial, coronal and sagittal views and three-dimensional MIP. COMPARISON: None. OTHER STUDIES USED FOR CORRELATION: CT chest dated 10/7/2024 and CTA of the coronary arteries dated 10 8/26/2024 FINDINGS: HEAD/NECK: Physiologic FDG activity in visualized brain, head, and neck. THORAX: No abnormal FDG activity. No lymphadenopathy. LUNGS: FDG-avid irregular, difficult to accurately measure left upper lobe masslike consolidation (SUV 6.2; image 96) and additional FDG-avid and too small to characterize left upper lobe nodules, with the largest in the lingula (SUV 4.5; image 117), are similar in size and appearance as compared to CT dated 10/7/2024. PLEURA/PERICARDIUM: No abnormal FDG activity. No effusion. HEBATOBILIARY/PANCREAS: Physiologic FDG activity. For reference, normal liver demonstrates SUV mean 2.7. Cholecystectomy. SPLEEN: Physiologic FDG activity. Normal in size. ADRENAL GLANDS: Nonspecific mild FDG activity in bilateral adrenal glands, demonstrating SUV 5.5 on the left (image 157) and SUV 4.5 on the right. Previously noted indeterminate 1.5 cm left adrenal nodule is better evaluated on recent diagnostic CT. KIDNEYS/URINARY BLADDER: Physiologic excreted FDG activity. REPRODUCTIVE ORGANS: No abnormal FDG activity. ABDOMINOPELVIC LYMPH NODES/RETROPERITONEUM: No enlarged or FDG-avid lymph node. ESOPHAGUS/STOMACH/BOWEL/PERITONEUM/MESENTERY: No abnormal FDG activity. VESSELS: Large vessel atherosclerotic calcifications. BONES/SOFT TISSUES: No abnormal FDG activity. IMPRESSION: Abnormal skull-to-thigh FDG-PET/CT scan. 1. FDG-avid left upper lobe masslike consolidation and additional FDG-avid and too small to characterize left upper lobe lung nodules, the largest in the lingula, are similar in size and appearance as compared to CT dated 10/7/2024. Findings are concerning for multifocal lung neoplasm. 2. Nonspecific mild FDG activity in bilateral adrenal glands, slightly greater on the left, where an indeterminate 1.5 cm nodule was noted on CT dated 10/7/2024. MRI may be obtained for further characterization. 3. The remainder of the study demonstrates no evidence of FDG-avid disease. 2 - CT CHEST - ORDERED BY: CARLOS AYALA PROCEDURE DATE: 10/07/2024 INTERPRETATION: INDICATION: Abnormal lung findings on cardiac CT  TECHNIQUE: Helical acquisition images of the chest without intravenous contrast. Maximum intensity projection images were generated.  COMPARISON: Partially included lungs of a cardiac CTA 8/26/2024.  FINDINGS:  LUNGS/AIRWAYS/PLEURA: Left upper lobe elongated masslike consolidation, difficult to measure given its shape however has an inferior component which is approximately 6 x 1.5 cm (measured on coronal series 601, image 90) and a superior component which is approximately 4 x 2.2 cm (measured on coronal series 601, image 78). Unchanged multiple other nodules in the left upper lobe, largest 2 cm in the lingula (2-65). Patent airways through the segmental bronchi. No pleural effusion.  LYMPH NODES/MEDIASTINUM: No lymphadenopathy.  HEART/VASCULATURE: Normal size heart and aorta. No pericardial effusion.  UPPER ABDOMEN: Indeterminate 1.5 cm left adrenal nodule.  BONES/SOFT TISSUES: Unremarkable.   IMPRESSION:  Left upper lobe elongated masslike consolidation, not fully within field-of-view of prior, and other left upper lobe nodules which are unchanged. Leading differential is lung neoplasm.  Indeterminate 1.5 cm left adrenal nodule  CT angio coronary artery study August 26, 2024 no thank Non Cardiac: Multiple left upper lobe ill-defined nodular opacities within the partially imaged lungs. Differential diagnostic considerations include infectious or neoplastic etiologies. Dedicated noncontrast chest CT is recommended for complete evaluation.  Blood draw

## 2025-07-01 NOTE — DISCUSSION/SUMMARY
[FreeTextEntry1] : Post cardiothoracic surgery biopsy Pathology Positive necrotizing granulomatous inflammation Noted AFB smear is negative GMS stain highlighted budding yeast suggestive of histoplasma additional stains are pending Ab negative Negative for malignant cells HISTO IGG IGM titers  negative  Postop complications including pneumothorax treated with a pigtail catheter with demonstrated resolution of the pneumothorax  In view of possible histoplasmosis antigen testing was ordered and Negative Will follow-up with pathology for any additional stains or cultures available Who is no evidence for nontuberculous Mycobacterium Inconsistent with rheumatoid nodule per pathology No suggestion of vasculitis Other etiologies can include sarcoidosis ACE level is normal Granulomatous disease Recommendations Complete serology is pending including histoplasmosis ANCA ACE level Based on prior studies most patients who did not receive medical therapy with no evidence of progression clinically or development of new nodules There is some consideration for steroid treatment I would hold off on additional treatment pending blood work and additional special stains is available from microbiology pathology Office follow-up 3 month Repeat CT imaging in 3 months In addition noted Rounded density left upper lobe appeared new post postop may be due to some lung injury we will follow clinically with a chest x-ray in 1 month All questions answered f/u return from from [place]

## 2025-07-01 NOTE — HISTORY OF PRESENT ILLNESS
[Former] : former [>= 20 pack years] : >= 20 pack years [TextBox_4] : 7/1/25 11/22/2024 74 yo female, here for initial visit bronchoscopy done 10/30/24 due to large mass on left lung with nodules, biopsy was negative, no CA- lung collapsed after that night, went back to the hospital and did another biopsy  pt of Dr. Randolph  feels horrible, slight SOB, sweating, extremely tired   Past medical history papillary cancer status post thyroidectomy 2010 postsurgical hypothyroidism Hypertension Anxiety depression   SUDHA Robotic Assisted FNA on 10/30/24: Negative for malignant cells. Necrotizing granulomatous inflammation.s/p SUDHA CT Guided core biopsy on 11/1/24: Negative for malignant cells. Necrotizing granulomatous inflammation.The cytology slides show scattered groups of benign bronchial cells, pneumocytes and few scattered yeast buds. The core biopsy sections show aggregates of epithelioid histiocytes associated with budding yeast like structures and necrosis  Hospital Course: Discharge Date 04-Nov-2024  Admission Date 31-Oct-2024 19:03 Reason for Admission SOB  Hospital Course 73 year old female former smoker underwent a Navigational Bronchoscopy w/ biopsy on 10/30/24. She was discharged home but returned to the ER the next day with worsening SOB and mild chest pain. On chest X-ray, she was found to have a left apical PTX. She was admitted on 10/31 and had a L PTC placement with SUDHA bx by IR the next day on 11/1  PATH  Fine Needle Aspiration Report - Auth (Verified)  Specimen(s) Submitted LUNG, LEFT UPPER LOBE, ROBOTIC ASSISTED FNA & FORCEPS BIOPSY  Final Diagnosis LUNG, LEFT UPPER LOBE, ROBOTIC ASSISTED FNA & FORCEPS BIOPSY NEGATIVE FOR MALIGNANT CELLS. Necrotizing granulomatous inflammation. Cytology smears and cell block display a hypocellular composed of groups of uniform and reactive ciliated bronchial epithelial cells, macrophages, and mixed inflammatory cells in a background of amorphous cellular debris. Forceps biopsy : reveals fragments of lung parenchyma with  clusters of epithelioid histiocytes in a background of multinucleated giant cells, lymphocytes and blood. The granulomas are associated with budding yeast like structures These cytomorphologic findings are consistent with non-necrotizing granulomatous inflammation.  Special stains:  GMS stain highlight budding yeast suggestive of  histoplasma. Additional special stains are in progress to attempt a further classification. AFB stain is negative for acid fast bacilli.   [TextBox_11] : 1 [YearQuit] : 1999